# Patient Record
Sex: MALE | Race: WHITE | NOT HISPANIC OR LATINO | Employment: FULL TIME | URBAN - METROPOLITAN AREA
[De-identification: names, ages, dates, MRNs, and addresses within clinical notes are randomized per-mention and may not be internally consistent; named-entity substitution may affect disease eponyms.]

---

## 2020-05-18 ENCOUNTER — OFFICE VISIT (OUTPATIENT)
Dept: SURGERY | Facility: CLINIC | Age: 70
End: 2020-05-18

## 2020-05-18 VITALS — BODY MASS INDEX: 23.25 KG/M2 | HEIGHT: 75 IN | WEIGHT: 187 LBS

## 2020-05-18 DIAGNOSIS — K40.90 UNILATERAL INGUINAL HERNIA WITHOUT OBSTRUCTION OR GANGRENE, RECURRENCE NOT SPECIFIED: Primary | ICD-10-CM

## 2020-05-18 PROCEDURE — 99203 OFFICE O/P NEW LOW 30 MIN: CPT | Performed by: SURGERY

## 2020-05-18 RX ORDER — ATORVASTATIN CALCIUM 20 MG/1
20 TABLET, FILM COATED ORAL DAILY
COMMUNITY

## 2020-05-18 RX ORDER — CEFAZOLIN SODIUM 2 G/100ML
2 INJECTION, SOLUTION INTRAVENOUS ONCE
Status: CANCELLED | OUTPATIENT
Start: 2020-06-02 | End: 2020-05-18

## 2020-05-18 RX ORDER — NIFEDIPINE 30 MG/1
30 TABLET, EXTENDED RELEASE ORAL DAILY
COMMUNITY

## 2020-05-18 RX ORDER — HYDROCHLOROTHIAZIDE 25 MG/1
25 TABLET ORAL DAILY
COMMUNITY

## 2020-05-18 NOTE — PROGRESS NOTES
Cc: Right inguinal hernia    History of presenting illness:   This is a very nice, reasonably healthy 69-year-old gentleman who states that approximately 2 weeks ago he was at work lifting up on a trailer door which was stuck and he noticed a sudden onset of a popping sensation and pain in his right groin.  The next day he noticed a bulge associated with this.  The bulge will go down and resolve when he lays flat.  There is mild pain associated with it which he describes as burning.  There is some radiation of this discomfort towards his testicles.  He has never had any symptoms similar to this.    Past Medical History: Hypertension, hyperlipidemia    Past Surgical History: Colonoscopy 2015, hand surgery 1969, appendectomy 1963    Medications: Atorvastatin, hydrochlorothiazide, nifedipine    Allergies: None known    Social History: He is a non-smoker, he remains active and employed, he works driving a truck for [employer] and his work sometimes does include some relatively strenuous activity    Family History: Negative for known colorectal cancer    Review of Systems:  Constitutional: Negative for fever, chills, change in weight  Neck: no swollen glands or dysphagia or odynophagia  Respiratory: negative for SOB, cough, hemoptysis or wheezing  Cardiovascular: negative for chest pain, palpitations or peripheral edema  Gastrointestinal: Negative for nausea, vomiting, positive for mild right lower quadrant abdominal pain, negative for constipation      Physical Exam:   Body mass index 23.4  General: alert and oriented, appropriate, no acute distress  Neck: Supple without lymphadenopathy or thyromegaly, trachea is in the midline  Respiratory: Lungs are clear bilaterally without wheezing, no use of accessory muscles is noted  Cardiovascular: Regular rate and rhythm without murmur, no peripheral edema  Gastrointestinal: Soft, benign, well-healed prior appendectomy scar noted in the right lower quadrant, no mass, no  ventral hernia  Genitourinary: Normal male external genitalia.  Confirmed reducible right inguinal hernia, no hernia felt on the left    Laboratory data: None    Imaging data: None      Assessment and plan:   -Initial symptomatic right inguinal hernia, reducible  -Options are discussed with the patient.  I have recommended proceeding with a da Nicol robot-assisted right inguinal hernia repair with mesh placement.  Risks including bleeding, infection, chronic inguinodynia, conversion to open, hematoma, injury to the vas deferens as well as vascular structures in the bladder all discussed.  Patient is agreeable to proceed.  -Further I discussed the benefits of robotic minimally invasive versus open surgery.  I have told him that my view is that robotic surgery will decrease the time until he can return to work, resulting less scarring, less postoperative pain and lower chance of hernia recurrence.      Keegan Francis MD, FACS  General, Minimally Invasive and Endoscopic Surgery  [facility]    [address], [address]  [place], [place], [address]  P: [phone]  F: [phone]

## 2020-05-22 PROBLEM — K40.90 UNILATERAL INGUINAL HERNIA WITHOUT OBSTRUCTION OR GANGRENE: Status: ACTIVE | Noted: 2020-05-22

## 2020-05-26 ENCOUNTER — TRANSCRIBE ORDERS (OUTPATIENT)
Dept: PREADMISSION TESTING | Facility: HOSPITAL | Age: 70
End: 2020-05-26

## 2020-05-26 DIAGNOSIS — Z01.818 OTHER SPECIFIED PRE-OPERATIVE EXAMINATION: Primary | ICD-10-CM

## 2020-05-27 ENCOUNTER — APPOINTMENT (OUTPATIENT)
Dept: PREADMISSION TESTING | Facility: HOSPITAL | Age: 70
End: 2020-05-27

## 2020-05-27 VITALS
RESPIRATION RATE: 16 BRPM | HEART RATE: 76 BPM | WEIGHT: 187.2 LBS | HEIGHT: 75 IN | OXYGEN SATURATION: 99 % | TEMPERATURE: 98.2 F | SYSTOLIC BLOOD PRESSURE: 121 MMHG | DIASTOLIC BLOOD PRESSURE: 73 MMHG | BODY MASS INDEX: 23.28 KG/M2

## 2020-05-27 DIAGNOSIS — K40.90 UNILATERAL INGUINAL HERNIA WITHOUT OBSTRUCTION OR GANGRENE, RECURRENCE NOT SPECIFIED: ICD-10-CM

## 2020-05-27 LAB
ANION GAP SERPL CALCULATED.3IONS-SCNC: 9 MMOL/L (ref 5–15)
BASOPHILS # BLD AUTO: 0.05 10*3/MM3 (ref 0–0.2)
BASOPHILS NFR BLD AUTO: 0.5 % (ref 0–1.5)
BUN BLD-MCNC: 14 MG/DL (ref 8–23)
BUN/CREAT SERPL: 18.7 (ref 7–25)
CALCIUM SPEC-SCNC: 9.5 MG/DL (ref 8.6–10.5)
CHLORIDE SERPL-SCNC: 98 MMOL/L (ref 98–107)
CO2 SERPL-SCNC: 30 MMOL/L (ref 22–29)
CREAT BLD-MCNC: 0.75 MG/DL (ref 0.76–1.27)
DEPRECATED RDW RBC AUTO: 41 FL (ref 37–54)
EOSINOPHIL # BLD AUTO: 0.19 10*3/MM3 (ref 0–0.4)
EOSINOPHIL NFR BLD AUTO: 2 % (ref 0.3–6.2)
ERYTHROCYTE [DISTWIDTH] IN BLOOD BY AUTOMATED COUNT: 12.5 % (ref 12.3–15.4)
GFR SERPL CREATININE-BSD FRML MDRD: 103 ML/MIN/1.73
GLUCOSE BLD-MCNC: 96 MG/DL (ref 65–99)
HCT VFR BLD AUTO: 39.8 % (ref 37.5–51)
HGB BLD-MCNC: 14.2 G/DL (ref 13–17.7)
IMM GRANULOCYTES # BLD AUTO: 0.04 10*3/MM3 (ref 0–0.05)
IMM GRANULOCYTES NFR BLD AUTO: 0.4 % (ref 0–0.5)
LYMPHOCYTES # BLD AUTO: 2.79 10*3/MM3 (ref 0.7–3.1)
LYMPHOCYTES NFR BLD AUTO: 29.3 % (ref 19.6–45.3)
MCH RBC QN AUTO: 31.9 PG (ref 26.6–33)
MCHC RBC AUTO-ENTMCNC: 35.7 G/DL (ref 31.5–35.7)
MCV RBC AUTO: 89.4 FL (ref 79–97)
MONOCYTES # BLD AUTO: 0.97 10*3/MM3 (ref 0.1–0.9)
MONOCYTES NFR BLD AUTO: 10.2 % (ref 5–12)
NEUTROPHILS # BLD AUTO: 5.49 10*3/MM3 (ref 1.7–7)
NEUTROPHILS NFR BLD AUTO: 57.6 % (ref 42.7–76)
NRBC BLD AUTO-RTO: 0 /100 WBC (ref 0–0.2)
PLATELET # BLD AUTO: 228 10*3/MM3 (ref 140–450)
PMV BLD AUTO: 10 FL (ref 6–12)
POTASSIUM BLD-SCNC: 4.1 MMOL/L (ref 3.5–5.2)
RBC # BLD AUTO: 4.45 10*6/MM3 (ref 4.14–5.8)
SODIUM BLD-SCNC: 137 MMOL/L (ref 136–145)
WBC NRBC COR # BLD: 9.53 10*3/MM3 (ref 3.4–10.8)

## 2020-05-27 PROCEDURE — 36415 COLL VENOUS BLD VENIPUNCTURE: CPT

## 2020-05-27 PROCEDURE — 85025 COMPLETE CBC W/AUTO DIFF WBC: CPT | Performed by: SURGERY

## 2020-05-27 PROCEDURE — U0004 COV-19 TEST NON-CDC HGH THRU: HCPCS

## 2020-05-27 PROCEDURE — 93005 ELECTROCARDIOGRAM TRACING: CPT

## 2020-05-27 PROCEDURE — C9803 HOPD COVID-19 SPEC COLLECT: HCPCS

## 2020-05-27 PROCEDURE — 80048 BASIC METABOLIC PNL TOTAL CA: CPT | Performed by: SURGERY

## 2020-05-27 PROCEDURE — 93010 ELECTROCARDIOGRAM REPORT: CPT | Performed by: INTERNAL MEDICINE

## 2020-05-27 RX ORDER — CHLORHEXIDINE GLUCONATE 500 MG/1
CLOTH TOPICAL
COMMUNITY
End: 2020-06-02 | Stop reason: HOSPADM

## 2020-05-27 RX ORDER — OMEPRAZOLE 20 MG/1
20 CAPSULE, DELAYED RELEASE ORAL DAILY
COMMUNITY

## 2020-05-30 ENCOUNTER — LAB (OUTPATIENT)
Dept: LAB | Facility: HOSPITAL | Age: 70
End: 2020-05-30

## 2020-05-30 DIAGNOSIS — Z01.818 OTHER SPECIFIED PRE-OPERATIVE EXAMINATION: ICD-10-CM

## 2020-05-30 PROCEDURE — U0004 COV-19 TEST NON-CDC HGH THRU: HCPCS

## 2020-06-01 LAB
REF LAB TEST METHOD: NORMAL
SARS-COV-2 RNA RESP QL NAA+PROBE: NOT DETECTED

## 2020-06-02 ENCOUNTER — HOSPITAL ENCOUNTER (OUTPATIENT)
Facility: HOSPITAL | Age: 70
Setting detail: HOSPITAL OUTPATIENT SURGERY
Discharge: HOME | End: 2020-06-02
Attending: SURGERY | Admitting: SURGERY

## 2020-06-02 ENCOUNTER — ANESTHESIA (OUTPATIENT)
Dept: PERIOP | Facility: HOSPITAL | Age: 70
End: 2020-06-02

## 2020-06-02 ENCOUNTER — ANESTHESIA EVENT (OUTPATIENT)
Dept: PERIOP | Facility: HOSPITAL | Age: 70
End: 2020-06-02

## 2020-06-02 VITALS
RESPIRATION RATE: 16 BRPM | HEART RATE: 84 BPM | TEMPERATURE: 97.7 F | OXYGEN SATURATION: 96 % | DIASTOLIC BLOOD PRESSURE: 91 MMHG | SYSTOLIC BLOOD PRESSURE: 147 MMHG

## 2020-06-02 DIAGNOSIS — K40.90 UNILATERAL INGUINAL HERNIA WITHOUT OBSTRUCTION OR GANGRENE, RECURRENCE NOT SPECIFIED: ICD-10-CM

## 2020-06-02 PROCEDURE — 25010000003 CEFAZOLIN IN DEXTROSE 2-4 GM/100ML-% SOLUTION: Performed by: SURGERY

## 2020-06-02 PROCEDURE — 25010000002 DEXAMETHASONE PER 1 MG: Performed by: NURSE ANESTHETIST, CERTIFIED REGISTERED

## 2020-06-02 PROCEDURE — 25010000002 PROPOFOL 10 MG/ML EMULSION: Performed by: NURSE ANESTHETIST, CERTIFIED REGISTERED

## 2020-06-02 PROCEDURE — 25010000002 HYDROMORPHONE PER 4 MG: Performed by: NURSE ANESTHETIST, CERTIFIED REGISTERED

## 2020-06-02 PROCEDURE — 25010000002 ONDANSETRON PER 1 MG: Performed by: NURSE ANESTHETIST, CERTIFIED REGISTERED

## 2020-06-02 PROCEDURE — S2900 ROBOTIC SURGICAL SYSTEM: HCPCS | Performed by: SURGERY

## 2020-06-02 PROCEDURE — 25010000002 FENTANYL CITRATE (PF) 100 MCG/2ML SOLUTION: Performed by: NURSE ANESTHETIST, CERTIFIED REGISTERED

## 2020-06-02 PROCEDURE — 25010000002 NEOSTIGMINE PER 0.5 MG: Performed by: NURSE ANESTHETIST, CERTIFIED REGISTERED

## 2020-06-02 PROCEDURE — 49650 LAP ING HERNIA REPAIR INIT: CPT | Performed by: SURGERY

## 2020-06-02 PROCEDURE — C1781 MESH (IMPLANTABLE): HCPCS | Performed by: SURGERY

## 2020-06-02 PROCEDURE — 49650 LAP ING HERNIA REPAIR INIT: CPT | Performed by: PHYSICIAN ASSISTANT

## 2020-06-02 DEVICE — BARD 3DMAX MESH RIGHT LARGE
Type: IMPLANTABLE DEVICE | Site: GROIN | Status: FUNCTIONAL
Brand: BARD 3DMAX MESH

## 2020-06-02 DEVICE — KNOTLESS TISSUE CONTROL DEVICE, UNDYED UNIDIRECTIONAL (ANTIBACTERIAL) SYNTHETIC ABSORBABLE DEVICE
Type: IMPLANTABLE DEVICE | Site: ABDOMEN | Status: FUNCTIONAL
Brand: STRATAFIX

## 2020-06-02 RX ORDER — HYDROMORPHONE HYDROCHLORIDE 1 MG/ML
0.5 INJECTION, SOLUTION INTRAMUSCULAR; INTRAVENOUS; SUBCUTANEOUS
Status: DISCONTINUED | OUTPATIENT
Start: 2020-06-02 | End: 2020-06-02 | Stop reason: HOSPADM

## 2020-06-02 RX ORDER — FAMOTIDINE 10 MG/ML
20 INJECTION, SOLUTION INTRAVENOUS
Status: COMPLETED | OUTPATIENT
Start: 2020-06-02 | End: 2020-06-02

## 2020-06-02 RX ORDER — SODIUM CHLORIDE 0.9 % (FLUSH) 0.9 %
10 SYRINGE (ML) INJECTION EVERY 12 HOURS SCHEDULED
Status: DISCONTINUED | OUTPATIENT
Start: 2020-06-02 | End: 2020-06-02 | Stop reason: HOSPADM

## 2020-06-02 RX ORDER — SODIUM CHLORIDE 0.9 % (FLUSH) 0.9 %
10 SYRINGE (ML) INJECTION AS NEEDED
Status: DISCONTINUED | OUTPATIENT
Start: 2020-06-02 | End: 2020-06-02 | Stop reason: HOSPADM

## 2020-06-02 RX ORDER — ROCURONIUM BROMIDE 10 MG/ML
INJECTION, SOLUTION INTRAVENOUS AS NEEDED
Status: DISCONTINUED | OUTPATIENT
Start: 2020-06-02 | End: 2020-06-02 | Stop reason: SURG

## 2020-06-02 RX ORDER — CEFAZOLIN SODIUM 2 G/100ML
2 INJECTION, SOLUTION INTRAVENOUS ONCE
Status: COMPLETED | OUTPATIENT
Start: 2020-06-02 | End: 2020-06-02

## 2020-06-02 RX ORDER — SODIUM CHLORIDE, SODIUM LACTATE, POTASSIUM CHLORIDE, CALCIUM CHLORIDE 600; 310; 30; 20 MG/100ML; MG/100ML; MG/100ML; MG/100ML
9 INJECTION, SOLUTION INTRAVENOUS CONTINUOUS PRN
Status: DISCONTINUED | OUTPATIENT
Start: 2020-06-02 | End: 2020-06-02 | Stop reason: HOSPADM

## 2020-06-02 RX ORDER — PROPOFOL 10 MG/ML
VIAL (ML) INTRAVENOUS AS NEEDED
Status: DISCONTINUED | OUTPATIENT
Start: 2020-06-02 | End: 2020-06-02 | Stop reason: SURG

## 2020-06-02 RX ORDER — BUPIVACAINE HYDROCHLORIDE AND EPINEPHRINE 5; 5 MG/ML; UG/ML
INJECTION, SOLUTION PERINEURAL AS NEEDED
Status: DISCONTINUED | OUTPATIENT
Start: 2020-06-02 | End: 2020-06-02 | Stop reason: HOSPADM

## 2020-06-02 RX ORDER — ACETAMINOPHEN 325 MG/1
650 TABLET ORAL ONCE AS NEEDED
Status: DISCONTINUED | OUTPATIENT
Start: 2020-06-02 | End: 2020-06-02 | Stop reason: HOSPADM

## 2020-06-02 RX ORDER — FENTANYL CITRATE 50 UG/ML
50 INJECTION, SOLUTION INTRAMUSCULAR; INTRAVENOUS
Status: DISCONTINUED | OUTPATIENT
Start: 2020-06-02 | End: 2020-06-02 | Stop reason: HOSPADM

## 2020-06-02 RX ORDER — DIPHENHYDRAMINE HYDROCHLORIDE 50 MG/ML
12.5 INJECTION INTRAMUSCULAR; INTRAVENOUS
Status: DISCONTINUED | OUTPATIENT
Start: 2020-06-02 | End: 2020-06-02 | Stop reason: HOSPADM

## 2020-06-02 RX ORDER — DIPHENHYDRAMINE HCL 25 MG
25 CAPSULE ORAL
Status: DISCONTINUED | OUTPATIENT
Start: 2020-06-02 | End: 2020-06-02 | Stop reason: HOSPADM

## 2020-06-02 RX ORDER — NALOXONE HCL 0.4 MG/ML
0.2 VIAL (ML) INJECTION AS NEEDED
Status: DISCONTINUED | OUTPATIENT
Start: 2020-06-02 | End: 2020-06-02 | Stop reason: HOSPADM

## 2020-06-02 RX ORDER — DEXAMETHASONE SODIUM PHOSPHATE 10 MG/ML
INJECTION INTRAMUSCULAR; INTRAVENOUS AS NEEDED
Status: DISCONTINUED | OUTPATIENT
Start: 2020-06-02 | End: 2020-06-02 | Stop reason: SURG

## 2020-06-02 RX ORDER — MIDAZOLAM HYDROCHLORIDE 1 MG/ML
2 INJECTION INTRAMUSCULAR; INTRAVENOUS
Status: DISCONTINUED | OUTPATIENT
Start: 2020-06-02 | End: 2020-06-02 | Stop reason: HOSPADM

## 2020-06-02 RX ORDER — HYDROCODONE BITARTRATE AND ACETAMINOPHEN 7.5; 325 MG/1; MG/1
1 TABLET ORAL EVERY 6 HOURS PRN
Qty: 25 TABLET | Refills: 0 | Status: SHIPPED | OUTPATIENT
Start: 2020-06-02 | End: 2020-07-13

## 2020-06-02 RX ORDER — ONDANSETRON 2 MG/ML
INJECTION INTRAMUSCULAR; INTRAVENOUS AS NEEDED
Status: DISCONTINUED | OUTPATIENT
Start: 2020-06-02 | End: 2020-06-02 | Stop reason: SURG

## 2020-06-02 RX ORDER — ONDANSETRON 2 MG/ML
4 INJECTION INTRAMUSCULAR; INTRAVENOUS ONCE AS NEEDED
Status: COMPLETED | OUTPATIENT
Start: 2020-06-02 | End: 2020-06-02

## 2020-06-02 RX ORDER — HYDROCODONE BITARTRATE AND ACETAMINOPHEN 7.5; 325 MG/1; MG/1
1 TABLET ORAL ONCE AS NEEDED
Status: DISCONTINUED | OUTPATIENT
Start: 2020-06-02 | End: 2020-06-02 | Stop reason: HOSPADM

## 2020-06-02 RX ORDER — MAGNESIUM HYDROXIDE 1200 MG/15ML
LIQUID ORAL AS NEEDED
Status: DISCONTINUED | OUTPATIENT
Start: 2020-06-02 | End: 2020-06-02 | Stop reason: HOSPADM

## 2020-06-02 RX ORDER — MIDAZOLAM HYDROCHLORIDE 1 MG/ML
1 INJECTION INTRAMUSCULAR; INTRAVENOUS
Status: DISCONTINUED | OUTPATIENT
Start: 2020-06-02 | End: 2020-06-02 | Stop reason: HOSPADM

## 2020-06-02 RX ORDER — FENTANYL CITRATE 50 UG/ML
INJECTION, SOLUTION INTRAMUSCULAR; INTRAVENOUS AS NEEDED
Status: DISCONTINUED | OUTPATIENT
Start: 2020-06-02 | End: 2020-06-02 | Stop reason: SURG

## 2020-06-02 RX ORDER — GLYCOPYRROLATE 0.2 MG/ML
INJECTION INTRAMUSCULAR; INTRAVENOUS AS NEEDED
Status: DISCONTINUED | OUTPATIENT
Start: 2020-06-02 | End: 2020-06-02 | Stop reason: SURG

## 2020-06-02 RX ORDER — EPHEDRINE SULFATE 50 MG/ML
5 INJECTION, SOLUTION INTRAVENOUS ONCE AS NEEDED
Status: DISCONTINUED | OUTPATIENT
Start: 2020-06-02 | End: 2020-06-02 | Stop reason: HOSPADM

## 2020-06-02 RX ORDER — FLUMAZENIL 0.1 MG/ML
0.2 INJECTION INTRAVENOUS AS NEEDED
Status: DISCONTINUED | OUTPATIENT
Start: 2020-06-02 | End: 2020-06-02 | Stop reason: HOSPADM

## 2020-06-02 RX ORDER — HYDRALAZINE HYDROCHLORIDE 20 MG/ML
5 INJECTION INTRAMUSCULAR; INTRAVENOUS
Status: DISCONTINUED | OUTPATIENT
Start: 2020-06-02 | End: 2020-06-02 | Stop reason: HOSPADM

## 2020-06-02 RX ORDER — LIDOCAINE HYDROCHLORIDE 20 MG/ML
INJECTION, SOLUTION INFILTRATION; PERINEURAL AS NEEDED
Status: DISCONTINUED | OUTPATIENT
Start: 2020-06-02 | End: 2020-06-02 | Stop reason: SURG

## 2020-06-02 RX ORDER — OXYCODONE AND ACETAMINOPHEN 7.5; 325 MG/1; MG/1
1 TABLET ORAL ONCE AS NEEDED
Status: COMPLETED | OUTPATIENT
Start: 2020-06-02 | End: 2020-06-02

## 2020-06-02 RX ADMIN — FENTANYL CITRATE 100 MCG: 50 INJECTION INTRAMUSCULAR; INTRAVENOUS at 10:27

## 2020-06-02 RX ADMIN — HYDROMORPHONE HYDROCHLORIDE 0.5 MG: 1 INJECTION, SOLUTION INTRAMUSCULAR; INTRAVENOUS; SUBCUTANEOUS at 12:08

## 2020-06-02 RX ADMIN — PROPOFOL 160 MG: 10 INJECTION, EMULSION INTRAVENOUS at 10:29

## 2020-06-02 RX ADMIN — ONDANSETRON 4 MG: 2 INJECTION INTRAMUSCULAR; INTRAVENOUS at 13:03

## 2020-06-02 RX ADMIN — HYDROMORPHONE HYDROCHLORIDE 0.5 MG: 1 INJECTION, SOLUTION INTRAMUSCULAR; INTRAVENOUS; SUBCUTANEOUS at 12:18

## 2020-06-02 RX ADMIN — FENTANYL CITRATE 50 MCG: 50 INJECTION, SOLUTION INTRAMUSCULAR; INTRAVENOUS at 12:13

## 2020-06-02 RX ADMIN — LIDOCAINE HYDROCHLORIDE 100 MG: 20 INJECTION, SOLUTION INFILTRATION; PERINEURAL at 10:27

## 2020-06-02 RX ADMIN — SODIUM CHLORIDE, POTASSIUM CHLORIDE, SODIUM LACTATE AND CALCIUM CHLORIDE 9 ML/HR: 600; 310; 30; 20 INJECTION, SOLUTION INTRAVENOUS at 09:09

## 2020-06-02 RX ADMIN — DEXAMETHASONE SODIUM PHOSPHATE 8 MG: 10 INJECTION INTRAMUSCULAR; INTRAVENOUS at 10:38

## 2020-06-02 RX ADMIN — FENTANYL CITRATE 50 MCG: 50 INJECTION, SOLUTION INTRAMUSCULAR; INTRAVENOUS at 12:03

## 2020-06-02 RX ADMIN — GLYCOPYRROLATE 0.4 MG: 0.2 INJECTION INTRAMUSCULAR; INTRAVENOUS at 11:40

## 2020-06-02 RX ADMIN — SODIUM CHLORIDE, POTASSIUM CHLORIDE, SODIUM LACTATE AND CALCIUM CHLORIDE: 600; 310; 30; 20 INJECTION, SOLUTION INTRAVENOUS at 11:36

## 2020-06-02 RX ADMIN — ONDANSETRON HYDROCHLORIDE 4 MG: 2 SOLUTION INTRAMUSCULAR; INTRAVENOUS at 11:36

## 2020-06-02 RX ADMIN — FAMOTIDINE 20 MG: 10 INJECTION, SOLUTION INTRAVENOUS at 09:08

## 2020-06-02 RX ADMIN — CEFAZOLIN SODIUM 2 G: 2 INJECTION, SOLUTION INTRAVENOUS at 10:35

## 2020-06-02 RX ADMIN — ROCURONIUM BROMIDE 50 MG: 10 INJECTION, SOLUTION INTRAVENOUS at 10:29

## 2020-06-02 RX ADMIN — OXYCODONE HYDROCHLORIDE AND ACETAMINOPHEN 1 TABLET: 7.5; 325 TABLET ORAL at 12:30

## 2020-06-02 RX ADMIN — NEOSTIGMINE METHYLSULFATE 2.5 MG: 1 INJECTION INTRAMUSCULAR; INTRAVENOUS; SUBCUTANEOUS at 11:40

## 2020-06-02 RX ADMIN — HYDROMORPHONE HYDROCHLORIDE 0.5 MG: 1 INJECTION, SOLUTION INTRAMUSCULAR; INTRAVENOUS; SUBCUTANEOUS at 12:30

## 2020-06-02 NOTE — ANESTHESIA PREPROCEDURE EVALUATION
Anesthesia Evaluation     Patient summary reviewed                Airway   Mallampati: II  No difficulty expected  Dental      Pulmonary    Cardiovascular     Rhythm: regular    (+) hypertension,       Neuro/Psych  GI/Hepatic/Renal/Endo      Musculoskeletal     Abdominal    Substance History      OB/GYN          Other                        Anesthesia Plan    ASA 2     general       Anesthetic plan, all risks, benefits, and alternatives have been provided, discussed and informed consent has been obtained with: patient.  Use of blood products discussed with patient .

## 2020-06-02 NOTE — ANESTHESIA PROCEDURE NOTES
Airway  Urgency: elective    Date/Time: 6/2/2020 10:33 AM  Airway not difficult    General Information and Staff    Patient location during procedure: OR  Anesthesiologist: Ashok Andrews MD  CRNA: Chai López CRNA    Indications and Patient Condition  Indications for airway management: airway protection    Preoxygenated: yes  Mask difficulty assessment: 1 - vent by mask    Final Airway Details  Final airway type: endotracheal airway      Successful airway: ETT  Cuffed: yes   Successful intubation technique: direct laryngoscopy  Endotracheal tube insertion site: oral  Blade: Walters  Blade size: 2  ETT size (mm): 8.0  Cormack-Lehane Classification: grade I - full view of glottis  Placement verified by: chest auscultation and capnometry   Measured from: lips  ETT/EBT  to lips (cm): 22  Number of attempts at approach: 1  Assessment: lips, teeth, and gum same as pre-op and atraumatic intubation    Additional Comments  Pre 02 100%, SIVI, DL x1, atraumatic intubation, BLBS, Positive ETC02.

## 2020-06-02 NOTE — OP NOTE
Operative Note :   Keegan Francis MD    Tanmay Alcantara  1950    Procedure Date: 06/02/20    Pre-op Diagnosis:  Right unilateral inguinal hernia without obstruction or gangrene, recurrence not specified [K40.90]    Post-Op Diagnosis:  Same    Procedure:   · Laparoscopic right inguinal hernia repair with da Nicol robot assistance and placement of mesh    Surgeon: Keegan Francis MD    Assistant: Chai Kohler PAC    Anesthesia:  General (general endotracheal tube)    EBL:   minimal    Specimens:   None    Indications:  · 69-year-old gentleman with a reducible symptomatic right inguinal hernia    Findings:   · Indirect defect on the right  · No hernia seen on the left    Recommendations:   · Routine post hernia care    Description of procedure:    After obtaining informed consent, patient brought to the operating room and placed under a general anesthetic.  His abdomen was sterilely prepped and draped.  Supraumbilical skin incision was made dissected through the subcutaneous tissue onto the fascia.  Fascia was incised.  Preperitoneal tissues were  and peritoneum was penetrated.  12 mm robotic trocar was introduced and the abdomen was insufflated.  Camera was introduced and no injury was seen.  Additional 8 mm robotic trochars were then introduced on the left and right at the level of the supraumbilical trocar.  The da Nicol Xi robot was then docked.  The Cadiere forceps were introduced on the left and the monopolar scissors on the right.  I then moved to the console.  The peritoneum was incised above and medial to the anterior superior iliac spine and this peritoneal incision was carried across medially to the level of the obliterated median umbilical vein.  I then dissected in the preperitoneal space along the medial aspect of this peritoneal incision until I got all the way down onto Foster's ligaments and dissected the medial space completely.  I then dissected the preperitoneal space lateral to the  hernia and then began dissecting the hernia sac out of the inguinal canal.  The hernia sac was  away from the cord structures.  The vas deferens and testicular vessels were lifted up and the edge of the peritoneum was pushed down.  I then introduced a large Bard preshaped polypropylene 3D mesh measuring 12.8 x 16 cm and oriented it appropriately.  The mesh was then secured to Foster's ligament medially with 2-0 Vicryl.  I then secured the mesh against the anterior abdominal wall both medial and lateral to the epigastric vessels again using 2-0 Vicryl suture.  The peritoneal flap was then lifted up and I ensured that it did not interfere with the inferior aspect of the mesh.  The flap was then closed with a running barbed 2-0 Monocryl suture.  Needles and trochars were withdrawn.  The abdomen was desufflated.  The midline fascial incision was reapproximated with #1 Vicryl suture.  Skin incisions were closed with 4-0 Monocryl.  Patient tolerated the procedure well.    Keegan Francis MD  General and Endoscopic Surgery  [facility] [place]    [address] [place] [address], [address]  [place], [place], [address]  P: [phone]  F: [phone]

## 2020-06-02 NOTE — ANESTHESIA POSTPROCEDURE EVALUATION
Patient: Tanmay Alcantara    Procedure Summary     Date:  06/02/20 Room / Location:  [facility] OR  / [facility] MAIN OR    Anesthesia Start:  1023 Anesthesia Stop:  1159    Procedure:  right INGUINAL HERNIA REPAIR LAPAROSCOPIC WITH DAVINCI ROBOT (Right Abdomen) Diagnosis:       Unilateral inguinal hernia without obstruction or gangrene, recurrence not specified      (Unilateral inguinal hernia without obstruction or gangrene, recurrence not specified [K40.90])    Surgeon:  Keegan Francis MD Provider:  Ashok Andrews MD    Anesthesia Type:  general ASA Status:  2          Anesthesia Type: general    Vitals  Vitals Value Taken Time   /88 6/2/2020  1:00 PM   Temp 36.5 °C (97.7 °F) 6/2/2020 11:55 AM   Pulse 78 6/2/2020  1:00 PM   Resp 16 6/2/2020  1:00 PM   SpO2 96 % 6/2/2020  1:08 PM   Vitals shown include unvalidated device data.        Post Anesthesia Care and Evaluation    Patient location during evaluation: bedside  Patient participation: complete - patient participated  Level of consciousness: awake and alert  Pain management: adequate  Airway patency: patent  Anesthetic complications: No anesthetic complications  PONV Status: none  Cardiovascular status: acceptable  Respiratory status: acceptable  Hydration status: acceptable    Comments: /91   Pulse 84   Temp 36.5 °C (97.7 °F) (Oral)   Resp 16   SpO2 96%

## 2020-06-15 ENCOUNTER — OFFICE VISIT (OUTPATIENT)
Dept: SURGERY | Facility: CLINIC | Age: 70
End: 2020-06-15

## 2020-06-15 DIAGNOSIS — K40.90 UNILATERAL INGUINAL HERNIA WITHOUT OBSTRUCTION OR GANGRENE, RECURRENCE NOT SPECIFIED: Primary | ICD-10-CM

## 2020-06-15 PROCEDURE — 99024 POSTOP FOLLOW-UP VISIT: CPT | Performed by: SURGERY

## 2020-06-15 NOTE — PROGRESS NOTES
Postop da Nicol robot-assisted right inguinal hernia repair    Subjective:  Having some pain in the right groin.  Some pain in the testicle as well.  Overall, doing well.  Eating well, bowels are functioning normally.    Objective:  No sign of hernia recurrence  There is tenderness in the right testicle.  Trocar sites on the abdomen are healing well    Assessment and plan:  -Postop right inguinal hernia repair, recovering adequately  -Some postop pain, recommended he use ibuprofen for the next week or so  -Follow-up with me in 4 weeks    Keegan Francis MD  General and Endoscopic Surgery  [facility]    [address], [address]  [place], [place], [address]  P: [phone]  F: [phone]

## 2020-07-13 ENCOUNTER — OFFICE VISIT (OUTPATIENT)
Dept: SURGERY | Facility: CLINIC | Age: 70
End: 2020-07-13

## 2020-07-13 VITALS — HEIGHT: 75 IN | BODY MASS INDEX: 23.4 KG/M2

## 2020-07-13 DIAGNOSIS — K40.90 UNILATERAL INGUINAL HERNIA WITHOUT OBSTRUCTION OR GANGRENE, RECURRENCE NOT SPECIFIED: Primary | ICD-10-CM

## 2020-07-13 PROCEDURE — 99024 POSTOP FOLLOW-UP VISIT: CPT | Performed by: SURGERY

## 2020-07-13 NOTE — PROGRESS NOTES
Postop robotic right inguinal hernia repair    Subjective:  Overall doing much better.  Still with some occasional right lower quadrant and right testicular pain.  Other days he will do great and have no problems.  He is being slightly more active now.    Objective:  Abdomen is soft benign, incisions are nicely healed.  Groin exam demonstrates some mild tenderness, no evidence of hernia recurrence is appreciated    Assessment and plan:  -Postop robotic right inguinal hernia repair, recovering well  -Still with some postop soreness, no evidence of hernia recurrence  -He still feels the soreness is going to impede his ability to go back to working normally.  I am going to let him go back on light duty starting next week and 6 weeks from now he may resume any and all activity.    Keegan Francis MD  General and Endoscopic Surgery  [facility]    [address], [address]  [place], [place], [address]  P: [phone]  F: [phone]

## (undated) DEVICE — SOL NACL 0.9PCT 1000ML

## (undated) DEVICE — TIP COVER ACCESSORY

## (undated) DEVICE — CANNULA SEAL

## (undated) DEVICE — VIOLET BRAIDED (POLYGLACTIN 910), SYNTHETIC ABSORBABLE SUTURE: Brand: COATED VICRYL

## (undated) DEVICE — GLV SURG BIOGEL LTX PF 8 1/2

## (undated) DEVICE — SUT MNCRYL PLS ANTIB UD 4/0 PS2 18IN

## (undated) DEVICE — REDUCER: Brand: ENDOWRIST

## (undated) DEVICE — BNDG ADHS PLSTC 1X3IN LF

## (undated) DEVICE — ARM DRAPE

## (undated) DEVICE — LOU GENERAL ROBOT: Brand: MEDLINE INDUSTRIES, INC.

## (undated) DEVICE — SOL ANTISTICK CAUTRY ELECTROLUBE LF

## (undated) DEVICE — UNDYED BRAIDED (POLYGLACTIN 910), SYNTHETIC ABSORBABLE SUTURE: Brand: COATED VICRYL

## (undated) DEVICE — SEAL

## (undated) DEVICE — 3M™ STERI-STRIP™ REINFORCED ADHESIVE SKIN CLOSURES, R1547, 1/2 IN X 4 IN (12 MM X 100 MM), 6 STRIPS/ENVELOPE: Brand: 3M™ STERI-STRIP™

## (undated) DEVICE — COLUMN DRAPE

## (undated) DEVICE — BLADELESS OBTURATOR: Brand: WECK VISTA

## (undated) DEVICE — LAPAROSCOPIC SMOKE FILTRATION SYSTEM: Brand: PALL LAPAROSHIELD® PLUS LAPAROSCOPIC SMOKE FILTRATION SYSTEM

## (undated) DEVICE — 3M™ STERI-STRIP™ COMPOUND BENZOIN TINCTURE 40 BAGS/CARTON 4 CARTONS/CASE C1544: Brand: 3M™ STERI-STRIP™